# Patient Record
Sex: FEMALE | Race: WHITE | NOT HISPANIC OR LATINO | Employment: FULL TIME | URBAN - METROPOLITAN AREA
[De-identification: names, ages, dates, MRNs, and addresses within clinical notes are randomized per-mention and may not be internally consistent; named-entity substitution may affect disease eponyms.]

---

## 2017-04-05 ENCOUNTER — APPOINTMENT (OUTPATIENT)
Dept: PSYCHIATRY | Facility: HOSPITAL | Age: 22
End: 2017-04-05

## 2017-04-12 ENCOUNTER — OFFICE VISIT (OUTPATIENT)
Dept: PSYCHIATRY | Facility: HOSPITAL | Age: 22
End: 2017-04-12

## 2017-04-12 DIAGNOSIS — F43.10 POST TRAUMATIC STRESS DISORDER (PTSD): Primary | ICD-10-CM

## 2017-04-12 PROCEDURE — 90791 PSYCH DIAGNOSTIC EVALUATION: CPT | Performed by: MARRIAGE & FAMILY THERAPIST

## 2017-04-19 ENCOUNTER — APPOINTMENT (OUTPATIENT)
Dept: PSYCHIATRY | Facility: HOSPITAL | Age: 22
End: 2017-04-19

## 2017-04-26 ENCOUNTER — OFFICE VISIT (OUTPATIENT)
Dept: PSYCHIATRY | Facility: HOSPITAL | Age: 22
End: 2017-04-26

## 2017-04-26 DIAGNOSIS — F43.10 POST TRAUMATIC STRESS DISORDER (PTSD): Primary | ICD-10-CM

## 2017-04-26 PROCEDURE — 90834 PSYTX W PT 45 MINUTES: CPT | Performed by: MARRIAGE & FAMILY THERAPIST
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
## 2017-05-03 ENCOUNTER — OFFICE VISIT (OUTPATIENT)
Dept: PSYCHIATRY | Facility: HOSPITAL | Age: 22
End: 2017-05-03

## 2017-05-03 DIAGNOSIS — F43.10 POST TRAUMATIC STRESS DISORDER (PTSD): Primary | ICD-10-CM

## 2017-05-03 PROCEDURE — 90834 PSYTX W PT 45 MINUTES: CPT | Performed by: MARRIAGE & FAMILY THERAPIST

## 2017-05-10 ENCOUNTER — OFFICE VISIT (OUTPATIENT)
Dept: PSYCHIATRY | Facility: HOSPITAL | Age: 22
End: 2017-05-10

## 2017-05-10 DIAGNOSIS — F43.10 POST TRAUMATIC STRESS DISORDER (PTSD): Primary | ICD-10-CM

## 2017-05-10 PROCEDURE — 90834 PSYTX W PT 45 MINUTES: CPT | Performed by: MARRIAGE & FAMILY THERAPIST

## 2017-05-17 ENCOUNTER — APPOINTMENT (OUTPATIENT)
Dept: PSYCHIATRY | Facility: HOSPITAL | Age: 22
End: 2017-05-17

## 2017-05-24 ENCOUNTER — OFFICE VISIT (OUTPATIENT)
Dept: PSYCHIATRY | Facility: HOSPITAL | Age: 22
End: 2017-05-24

## 2017-05-24 DIAGNOSIS — F43.10 POST TRAUMATIC STRESS DISORDER (PTSD): Primary | ICD-10-CM

## 2017-05-24 PROCEDURE — 90834 PSYTX W PT 45 MINUTES: CPT | Performed by: MARRIAGE & FAMILY THERAPIST

## 2017-05-31 ENCOUNTER — OFFICE VISIT (OUTPATIENT)
Dept: PSYCHIATRY | Facility: HOSPITAL | Age: 22
End: 2017-05-31

## 2017-05-31 DIAGNOSIS — F43.10 POST TRAUMATIC STRESS DISORDER (PTSD): Primary | ICD-10-CM

## 2017-05-31 PROCEDURE — 90834 PSYTX W PT 45 MINUTES: CPT | Performed by: MARRIAGE & FAMILY THERAPIST

## 2017-06-07 ENCOUNTER — OFFICE VISIT (OUTPATIENT)
Dept: PSYCHIATRY | Facility: HOSPITAL | Age: 22
End: 2017-06-07

## 2017-06-07 DIAGNOSIS — F43.10 POST TRAUMATIC STRESS DISORDER (PTSD): Primary | ICD-10-CM

## 2017-06-07 PROCEDURE — 90834 PSYTX W PT 45 MINUTES: CPT | Performed by: MARRIAGE & FAMILY THERAPIST

## 2017-06-08 NOTE — PROGRESS NOTES
Met w/Pt from 12:00-12:50pm this day.  She needed to focus on fears around her finances and the lack of emotional support while she's struggling. She said it had been on her mind the past few weeks, but she didn't know if she should bring it up. Assured her that this time is whatever she needs it to be for her and that EMDR can always be postponed a session or two for that purpose.  Focused on what things she has some control over re: finances.  At end of session, she said she felt better in finally expressing what was going on with that.  To return on 6/14/2017 @ 12:00pm.

## 2017-06-14 ENCOUNTER — OFFICE VISIT (OUTPATIENT)
Dept: PSYCHIATRY | Facility: HOSPITAL | Age: 22
End: 2017-06-14

## 2017-06-14 DIAGNOSIS — F43.10 POST TRAUMATIC STRESS DISORDER (PTSD): Primary | ICD-10-CM

## 2017-06-14 PROCEDURE — 90832 PSYTX W PT 30 MINUTES: CPT | Performed by: MARRIAGE & FAMILY THERAPIST

## 2017-06-15 NOTE — PROGRESS NOTES
Met w/Pt from 12:00-12:29pm (Pt said she had to leave early for a work mtg) this day.  Today was likely the final session with Pt as this office closes permanently on Friday, June 16, 2017. Pt said she will need to continue to use her insurance benefit. Offered her names of three providers who are trained in EMDR and accept Pt's insurance, one of whom is trained in EMDR ET protocol.

## 2017-07-10 ENCOUNTER — OFFICE VISIT (OUTPATIENT)
Dept: FAMILY MEDICINE CLINIC | Facility: CLINIC | Age: 22
End: 2017-07-10

## 2017-07-10 VITALS
OXYGEN SATURATION: 98 % | HEIGHT: 64 IN | DIASTOLIC BLOOD PRESSURE: 72 MMHG | BODY MASS INDEX: 35.17 KG/M2 | WEIGHT: 206 LBS | SYSTOLIC BLOOD PRESSURE: 118 MMHG | HEART RATE: 74 BPM

## 2017-07-10 DIAGNOSIS — N30.10 IC (INTERSTITIAL CYSTITIS): ICD-10-CM

## 2017-07-10 DIAGNOSIS — R31.9 HEMATURIA: Primary | ICD-10-CM

## 2017-07-10 DIAGNOSIS — N30.90 CYSTITIS: ICD-10-CM

## 2017-07-10 LAB
BILIRUB BLD-MCNC: NEGATIVE MG/DL
CLARITY, POC: CLEAR
COLOR UR: YELLOW
GLUCOSE UR STRIP-MCNC: NEGATIVE MG/DL
KETONES UR QL: NEGATIVE
LEUKOCYTE EST, POC: NEGATIVE
NITRITE UR-MCNC: NEGATIVE MG/ML
PH UR: 6 [PH] (ref 5–8)
PROT UR STRIP-MCNC: ABNORMAL MG/DL
RBC # UR STRIP: NEGATIVE /UL
SP GR UR: 1.01 (ref 1–1.03)
UROBILINOGEN UR QL: NORMAL

## 2017-07-10 PROCEDURE — 81002 URINALYSIS NONAUTO W/O SCOPE: CPT | Performed by: NURSE PRACTITIONER

## 2017-07-10 PROCEDURE — 99213 OFFICE O/P EST LOW 20 MIN: CPT | Performed by: NURSE PRACTITIONER

## 2017-07-10 RX ORDER — SULFAMETHOXAZOLE AND TRIMETHOPRIM 800; 160 MG/1; MG/1
1 TABLET ORAL 2 TIMES DAILY
Qty: 14 TABLET | Refills: 0 | Status: SHIPPED | OUTPATIENT
Start: 2017-07-10

## 2017-07-10 RX ORDER — NITROFURANTOIN MACROCRYSTALS 100 MG/1
CAPSULE ORAL
Qty: 90 CAPSULE | Refills: 0 | Status: SHIPPED | OUTPATIENT
Start: 2017-07-10

## 2017-07-10 NOTE — PROGRESS NOTES
Sabas Engle is a 21 y.o. female.Patient admits to pelvic pain and blood in her urine that has been present for 3 days. New pt to me. Pt had been dx with recurrent UTI and Interstitial cystitis. Pt has taken macrobid post coitally in the past and had a workup in [place]. Pt did not have the urodynamics. LMP - 2 weeks ago.   Urinary Tract Infection: Patient complains of burning with urination, dysuria, frequency, hesitancy, incomplete bladder emptying, suprapubic pressure and urgency She has had symptoms for 4 days. Patient also complains of no CVA pain or fever. Patient denies back pain, fever, stomach ache and vaginal discharge. Patient does have a history of recurrent UTI.  Patient does not have a history of pyelonephritis.  Pt is sexually active with 1 partner.     Seen 07/10/2017    Assessment/Plan   Problem List Items Addressed This Visit     None      Visit Diagnoses     Hematuria    -  Primary    Relevant Medications    sulfamethoxazole-trimethoprim (BACTRIM DS,SEPTRA DS) 800-160 MG per tablet    Other Relevant Orders    POCT urinalysis dipstick, manual (Completed)    Ambulatory Referral to Urology    Cystitis        Relevant Medications    sulfamethoxazole-trimethoprim (BACTRIM DS,SEPTRA DS) 800-160 MG per tablet    nitrofurantoin (MACRODANTIN) 100 MG capsule    Other Relevant Orders    Ambulatory Referral to Urology    IC (interstitial cystitis)        Relevant Medications    nitrofurantoin (MACRODANTIN) 100 MG capsule    Other Relevant Orders    Ambulatory Referral to Urology             Return for Annual.  Patient Instructions   Interstitial Cystitis  Interstitial cystitis is a condition that causes inflammation of the bladder. The bladder is a hollow organ in the lower part of your abdomen. It stores urine after the urine is made by your kidneys. With interstitial cystitis, you may have pain in the bladder area. You may also have a frequent and urgent need to urinate.  The severity of interstitial  cystitis can vary from person to person. You may have flare-ups of the condition, and then it may go away for a while. For many people who have this condition, it becomes a long-term problem.  CAUSES  The cause of this condition is not known.  RISK FACTORS  This condition is more likely to develop in women.  SYMPTOMS  Symptoms of interstitial cystitis vary, and they can change over time. Symptoms may include:  · Discomfort or pain in the bladder area. This can range from mild to severe. The pain may change in intensity as the bladder fills with urine or as it empties.  · Pelvic pain.  · An urgent need to urinate.  · Frequent urination.  · Pain during sexual intercourse.  · Pinpoint bleeding on the bladder wall.  For women, the symptoms often get worse during menstruation.  DIAGNOSIS  This condition is diagnosed by evaluating your symptoms and ruling out other causes. A physical exam will be done. Various tests may be done to rule out other conditions. Common tests include:  · Urine tests.  · Cystoscopy. In this test, a tool that is like a very thin telescope is used to look into your bladder.  · Biopsy. This involves taking a sample of tissue from the bladder wall to be examined under a microscope.  TREATMENT  There is no cure for interstitial cystitis, but treatment methods are available to control your symptoms. Work closely with your health care provider to find the treatments that will be most effective for you. Treatment options may include:  · Medicines to relieve pain and to help reduce the number of times that you feel the need to urinate.  · Bladder training. This involves learning ways to control when you urinate, such as:    Urinating at scheduled times.    Training yourself to delay urination.    Doing exercises (Kegel exercises) to strengthen the muscles that control urine flow.  · Lifestyle changes, such as changing your diet or taking steps to control stress.  · Use of a device that provides  electrical stimulation in order to reduce pain.  · A procedure that stretches your bladder by filling it with air or fluid.  · Surgery. This is rare. It is only done for extreme cases if other treatments do not help.  HOME CARE INSTRUCTIONS  · Take medicines only as directed by your health care provider.  · Use bladder training techniques as directed.    Keep a bladder diary to find out which foods, liquids, or activities make your symptoms worse.    Use your bladder diary to schedule bathroom trips. If you are away from home, plan to be near a bathroom at each of your scheduled times.    Make sure you urinate just before you leave the house and just before you go to bed.  · Do Kegel exercises as directed by your health care provider.  · Do not drink alcohol.  · Do not use any tobacco products, including cigarettes, chewing tobacco, or electronic cigarettes. If you need help quitting, ask your health care provider.  · Make dietary changes as directed by your health care provider. You may need to avoid spicy foods and foods that contain a high amount of potassium.  · Limit your drinking of beverages that stimulate urination. These include soda, coffee, and tea.  · Keep all follow-up visits as directed by your health care provider. This is important.  SEEK MEDICAL CARE IF:  · Your symptoms do not get better after treatment.  · Your pain and discomfort are getting worse.  · You have more frequent urges to urinate.  · You have a fever.  SEEK IMMEDIATE MEDICAL CARE IF:  · You are not able to control your bladder at all.     This information is not intended to replace advice given to you by your health care provider. Make sure you discuss any questions you have with your health care provider.     Document Released: 08/18/2005 Document Revised: 01/08/2016 Document Reviewed: 08/25/2015  [employer] Interactive Patient Education ©2017 [employer]        Vitals:    07/10/17 1451   BP: 118/72   Pulse: 74   SpO2: 98%   Weight:  "206 lb (93.4 kg)   Height: 64\" (162.6 cm)     Body mass index is 35.36 kg/(m^2).    Subjective     Chief Complaint   Patient presents with   • Urinary Tract Infection     Social History   Substance Use Topics   • Smoking status: Never Smoker   • Smokeless tobacco: Not on file   • Alcohol use Yes      Comment: occ       History of Present Illness     The following portions of the patient's history were reviewed and updated as appropriate:PMHroutine: Social history , Allergies, Current Medications, Active Problem List and Health Maintenance    Review of Systems   Genitourinary: Positive for dysuria and hematuria.       Objective   Physical Exam   Constitutional: She appears well-developed and well-nourished. No distress.   Cardiovascular: Normal rate and regular rhythm.    Pulmonary/Chest: Effort normal.   Abdominal: Soft. There is no tenderness. There is no guarding.   Musculoskeletal: She exhibits no edema.   Skin: Skin is warm and dry. No rash noted.   Nursing note and vitals reviewed.  no CVA tenderness    Reviewed Data:  Office Visit on 07/10/2017   Component Date Value Ref Range Status   • Color 07/10/2017 Yellow  Yellow, Straw, Dark Yellow, Katherine Final   • Clarity, UA 07/10/2017 Clear  Clear Final   • Glucose, UA 07/10/2017 Negative  Negative, 1000 mg/dL (3+) mg/dL Final   • Bilirubin 07/10/2017 Negative  Negative Final   • Ketones, UA 07/10/2017 Negative  Negative Final   • Specific Gravity  07/10/2017 1.015  1.005 - 1.030 Final   • Blood, UA 07/10/2017 Negative  Negative Final   • pH, Urine 07/10/2017 6.0  5.0 - 8.0 Final   • Protein, POC 07/10/2017 1+* Negative mg/dL Final   • Urobilinogen, UA 07/10/2017 Normal  Normal Final   • Leukocytes 07/10/2017 Negative  Negative Final   • Nitrite, UA 07/10/2017 Negative  Negative Final         "

## 2017-07-10 NOTE — PATIENT INSTRUCTIONS
Interstitial Cystitis  Interstitial cystitis is a condition that causes inflammation of the bladder. The bladder is a hollow organ in the lower part of your abdomen. It stores urine after the urine is made by your kidneys. With interstitial cystitis, you may have pain in the bladder area. You may also have a frequent and urgent need to urinate.  The severity of interstitial cystitis can vary from person to person. You may have flare-ups of the condition, and then it may go away for a while. For many people who have this condition, it becomes a long-term problem.  CAUSES  The cause of this condition is not known.  RISK FACTORS  This condition is more likely to develop in women.  SYMPTOMS  Symptoms of interstitial cystitis vary, and they can change over time. Symptoms may include:  · Discomfort or pain in the bladder area. This can range from mild to severe. The pain may change in intensity as the bladder fills with urine or as it empties.  · Pelvic pain.  · An urgent need to urinate.  · Frequent urination.  · Pain during sexual intercourse.  · Pinpoint bleeding on the bladder wall.  For women, the symptoms often get worse during menstruation.  DIAGNOSIS  This condition is diagnosed by evaluating your symptoms and ruling out other causes. A physical exam will be done. Various tests may be done to rule out other conditions. Common tests include:  · Urine tests.  · Cystoscopy. In this test, a tool that is like a very thin telescope is used to look into your bladder.  · Biopsy. This involves taking a sample of tissue from the bladder wall to be examined under a microscope.  TREATMENT  There is no cure for interstitial cystitis, but treatment methods are available to control your symptoms. Work closely with your health care provider to find the treatments that will be most effective for you. Treatment options may include:  · Medicines to relieve pain and to help reduce the number of times that you feel the need to  urinate.  · Bladder training. This involves learning ways to control when you urinate, such as:    Urinating at scheduled times.    Training yourself to delay urination.    Doing exercises (Kegel exercises) to strengthen the muscles that control urine flow.  · Lifestyle changes, such as changing your diet or taking steps to control stress.  · Use of a device that provides electrical stimulation in order to reduce pain.  · A procedure that stretches your bladder by filling it with air or fluid.  · Surgery. This is rare. It is only done for extreme cases if other treatments do not help.  HOME CARE INSTRUCTIONS  · Take medicines only as directed by your health care provider.  · Use bladder training techniques as directed.    Keep a bladder diary to find out which foods, liquids, or activities make your symptoms worse.    Use your bladder diary to schedule bathroom trips. If you are away from home, plan to be near a bathroom at each of your scheduled times.    Make sure you urinate just before you leave the house and just before you go to bed.  · Do Kegel exercises as directed by your health care provider.  · Do not drink alcohol.  · Do not use any tobacco products, including cigarettes, chewing tobacco, or electronic cigarettes. If you need help quitting, ask your health care provider.  · Make dietary changes as directed by your health care provider. You may need to avoid spicy foods and foods that contain a high amount of potassium.  · Limit your drinking of beverages that stimulate urination. These include soda, coffee, and tea.  · Keep all follow-up visits as directed by your health care provider. This is important.  SEEK MEDICAL CARE IF:  · Your symptoms do not get better after treatment.  · Your pain and discomfort are getting worse.  · You have more frequent urges to urinate.  · You have a fever.  SEEK IMMEDIATE MEDICAL CARE IF:  · You are not able to control your bladder at all.     This information is not  intended to replace advice given to you by your health care provider. Make sure you discuss any questions you have with your health care provider.     Document Released: 08/18/2005 Document Revised: 01/08/2016 Document Reviewed: 08/25/2015  [employer] Interactive Patient Education ©2017 [employer]